# Patient Record
Sex: MALE | Race: WHITE | Employment: FULL TIME | URBAN - METROPOLITAN AREA
[De-identification: names, ages, dates, MRNs, and addresses within clinical notes are randomized per-mention and may not be internally consistent; named-entity substitution may affect disease eponyms.]

---

## 2017-03-08 ENCOUNTER — HOSPITAL ENCOUNTER (EMERGENCY)
Facility: HOSPITAL | Age: 22
Discharge: HOME | End: 2017-03-08
Attending: EMERGENCY MEDICINE

## 2017-03-08 ENCOUNTER — APPOINTMENT (OUTPATIENT)
Dept: CT IMAGING | Facility: HOSPITAL | Age: 22
End: 2017-03-08
Attending: EMERGENCY MEDICINE

## 2017-03-08 VITALS
HEIGHT: 73 IN | SYSTOLIC BLOOD PRESSURE: 105 MMHG | RESPIRATION RATE: 16 BRPM | TEMPERATURE: 98 F | HEART RATE: 77 BPM | WEIGHT: 125 LBS | OXYGEN SATURATION: 99 % | DIASTOLIC BLOOD PRESSURE: 68 MMHG | BODY MASS INDEX: 16.57 KG/M2

## 2017-03-08 DIAGNOSIS — I88.0 MESENTERIC ADENITIS: Primary | ICD-10-CM

## 2017-03-08 LAB
ALBUMIN SERPL-MCNC: 4.1 G/DL (ref 3.5–4.8)
ALP LIVER SERPL-CCNC: 33 U/L (ref 45–117)
ALT SERPL-CCNC: 10 U/L (ref 17–63)
AST SERPL-CCNC: 14 U/L (ref 15–41)
BASOPHILS # BLD AUTO: 0.01 X10(3) UL (ref 0–0.1)
BASOPHILS NFR BLD AUTO: 0.3 %
BILIRUB SERPL-MCNC: 0.8 MG/DL (ref 0.1–2)
BUN BLD-MCNC: 11 MG/DL (ref 8–20)
CALCIUM BLD-MCNC: 9 MG/DL (ref 8.3–10.3)
CHLORIDE: 105 MMOL/L (ref 101–111)
CLARITY UR REFRACT.AUTO: CLEAR
CO2: 30 MMOL/L (ref 22–32)
CREAT BLD-MCNC: 0.96 MG/DL (ref 0.7–1.3)
EOSINOPHIL # BLD AUTO: 0.1 X10(3) UL (ref 0–0.3)
EOSINOPHIL NFR BLD AUTO: 2.6 %
ERYTHROCYTE [DISTWIDTH] IN BLOOD BY AUTOMATED COUNT: 12.2 % (ref 11.5–16)
GLUCOSE BLD-MCNC: 83 MG/DL (ref 70–99)
GLUCOSE UR STRIP.AUTO-MCNC: NEGATIVE MG/DL
HCT VFR BLD AUTO: 45.3 % (ref 37–53)
HGB BLD-MCNC: 16.4 G/DL (ref 13–17)
IMMATURE GRANULOCYTE COUNT: 0.01 X10(3) UL (ref 0–1)
IMMATURE GRANULOCYTE RATIO %: 0.3 %
KETONES UR STRIP.AUTO-MCNC: 80 MG/DL
LARGE PLATELETS: PRESENT
LEUKOCYTE ESTERASE UR QL STRIP.AUTO: NEGATIVE
LYMPHOCYTES # BLD AUTO: 1.17 X10(3) UL (ref 0.9–4)
LYMPHOCYTES NFR BLD AUTO: 30 %
M PROTEIN MFR SERPL ELPH: 7.5 G/DL (ref 6.1–8.3)
MCH RBC QN AUTO: 32.3 PG (ref 27–33.2)
MCHC RBC AUTO-ENTMCNC: 36.2 G/DL (ref 31–37)
MCV RBC AUTO: 89.2 FL (ref 80–99)
MONOCYTES # BLD AUTO: 0.45 X10(3) UL (ref 0.1–0.6)
MONOCYTES NFR BLD AUTO: 11.5 %
MORPHOLOGY: NORMAL
NEUTROPHIL ABS PRELIM: 2.16 X10 (3) UL (ref 1.3–6.7)
NEUTROPHILS # BLD AUTO: 2.16 X10(3) UL (ref 1.3–6.7)
NEUTROPHILS NFR BLD AUTO: 55.3 %
NITRITE UR QL STRIP.AUTO: NEGATIVE
PH UR STRIP.AUTO: 5 [PH] (ref 4.5–8)
PLATELET # BLD AUTO: 148 10(3)UL (ref 150–450)
POTASSIUM SERPL-SCNC: 3.4 MMOL/L (ref 3.6–5.1)
PROT UR STRIP.AUTO-MCNC: NEGATIVE MG/DL
RBC # BLD AUTO: 5.08 X10(6)UL (ref 4.3–5.7)
RBC UR QL AUTO: NEGATIVE
RED CELL DISTRIBUTION WIDTH-SD: 40.4 FL (ref 35.1–46.3)
SODIUM SERPL-SCNC: 141 MMOL/L (ref 136–144)
SP GR UR STRIP.AUTO: 1.03 (ref 1–1.03)
UROBILINOGEN UR STRIP.AUTO-MCNC: 4 MG/DL
WBC # BLD AUTO: 3.9 X10(3) UL (ref 4–13)

## 2017-03-08 PROCEDURE — 85025 COMPLETE CBC W/AUTO DIFF WBC: CPT | Performed by: EMERGENCY MEDICINE

## 2017-03-08 PROCEDURE — 96374 THER/PROPH/DIAG INJ IV PUSH: CPT

## 2017-03-08 PROCEDURE — 80053 COMPREHEN METABOLIC PANEL: CPT | Performed by: EMERGENCY MEDICINE

## 2017-03-08 PROCEDURE — 81003 URINALYSIS AUTO W/O SCOPE: CPT | Performed by: EMERGENCY MEDICINE

## 2017-03-08 PROCEDURE — 74176 CT ABD & PELVIS W/O CONTRAST: CPT

## 2017-03-08 PROCEDURE — 99284 EMERGENCY DEPT VISIT MOD MDM: CPT

## 2017-03-08 RX ORDER — ONDANSETRON 2 MG/ML
4 INJECTION INTRAMUSCULAR; INTRAVENOUS ONCE
Status: COMPLETED | OUTPATIENT
Start: 2017-03-08 | End: 2017-03-08

## 2017-03-08 NOTE — ED INITIAL ASSESSMENT (HPI)
Right lower abdominal pain and vomiting. Patient said pain is worse today. Complaint of chills. Unknown if fevers.

## 2017-03-08 NOTE — ED PROVIDER NOTES
Patient Seen in: [facility] Emergency Department    History   Patient presents with:  Abdomen/Flank Pain (GI/)    Stated Complaint: abd pain    HPI    Patient is a pleasant 27-year-old male, presenting for evaluation of right lower abdominal pain. stated in HPI.     Physical Exam       ED Triage Vitals   BP 03/08/17 1433 118/75 mmHg   Pulse 03/08/17 1433 86   Resp 03/08/17 1433 16   Temp 03/08/17 1433 98.1 °F (36.7 °C)   Temp src 03/08/17 1433 Temporal   SpO2 03/08/17 1433 99 %   O2 Device 03/08/17 1 below (*)     Large Platelets Present (*)     All other components within normal limits   CBC W/ DIFFERENTIAL - Abnormal; Notable for the following:     WBC 3.9 (*)     .0 (*)     All other components within normal limits   CBC WITH DIFFERENTIAL WIT cm. In dilated small bowel loops. BONES:  No lytic or destructive process.  PELVIC ORGANS:  There is some free fluid in the right side of the pelvis surrounding the appendix which is subsequently displaced post rectal contrast. The appendix is normal in ca

## 2017-09-15 ENCOUNTER — HOSPITAL ENCOUNTER (OUTPATIENT)
Age: 22
Discharge: HOME | End: 2017-09-15
Attending: FAMILY MEDICINE
Payer: COMMERCIAL

## 2017-09-15 VITALS
RESPIRATION RATE: 16 BRPM | HEART RATE: 75 BPM | DIASTOLIC BLOOD PRESSURE: 77 MMHG | SYSTOLIC BLOOD PRESSURE: 123 MMHG | TEMPERATURE: 99 F | OXYGEN SATURATION: 99 %

## 2017-09-15 DIAGNOSIS — J02.9 TONSILLOPHARYNGITIS: Primary | ICD-10-CM

## 2017-09-15 DIAGNOSIS — K12.2 UVULITIS: ICD-10-CM

## 2017-09-15 LAB
POCT MONO: NEGATIVE
POCT RAPID STREP: NEGATIVE

## 2017-09-15 PROCEDURE — 86308 HETEROPHILE ANTIBODY SCREEN: CPT | Performed by: FAMILY MEDICINE

## 2017-09-15 PROCEDURE — 87430 STREP A AG IA: CPT | Performed by: FAMILY MEDICINE

## 2017-09-15 PROCEDURE — 99214 OFFICE O/P EST MOD 30 MIN: CPT

## 2017-09-15 PROCEDURE — 87081 CULTURE SCREEN ONLY: CPT | Performed by: FAMILY MEDICINE

## 2017-09-15 RX ORDER — AMOXICILLIN AND CLAVULANATE POTASSIUM 875; 125 MG/1; MG/1
1 TABLET, FILM COATED ORAL 2 TIMES DAILY
Qty: 20 TABLET | Refills: 0 | Status: SHIPPED | OUTPATIENT
Start: 2017-09-15 | End: 2017-09-25

## 2017-09-16 NOTE — ED PROVIDER NOTES
Patient Seen in: [facility]    History   Patient presents with:  Sinus Problem  Sore Throat    Stated Complaint: sore throat and possible sinus infection    HPI  25 yo M here with complaints of sore throat and sinus infection, n elements reviewed from today and agreed except as otherwise stated in HPI.     Physical Exam   ED Triage Vitals [09/15/17 1906]  BP: 123/77  Pulse: 75  Resp: 16  Temp: 98.5 °F (36.9 °C)  Temp src: Oral  SpO2: 99 %  O2 Device: None (Room air)    Current:BP 1 negative   Rapid test - negative   Throat Cx pending   Rx Augmentin 875 mg twice daily X 10 days   Take daily probiotics (Culturelle / Florastor) while on the antibiotic to avoid any GI distress from the antibiotic       Disposition and Plan     Clinical I

## 2017-09-16 NOTE — ED INITIAL ASSESSMENT (HPI)
Patient presents with sore throat and sinus congestion x 5 days. Clear nasal drainage. Sweats. Dry cough

## 2018-04-13 PROCEDURE — 82746 ASSAY OF FOLIC ACID SERUM: CPT | Performed by: FAMILY MEDICINE

## 2018-04-13 PROCEDURE — 82607 VITAMIN B-12: CPT | Performed by: FAMILY MEDICINE

## 2018-04-13 PROCEDURE — 86803 HEPATITIS C AB TEST: CPT | Performed by: FAMILY MEDICINE

## 2018-04-13 PROCEDURE — 82747 ASSAY OF FOLIC ACID RBC: CPT | Performed by: FAMILY MEDICINE

## 2018-10-22 ENCOUNTER — LAB SERVICES (OUTPATIENT)
Dept: OTHER | Age: 23
End: 2018-10-22

## 2018-10-22 ENCOUNTER — CHARTING TRANS (OUTPATIENT)
Dept: OTHER | Age: 23
End: 2018-10-22

## 2018-10-22 LAB — RAPID STREP GROUP A: NORMAL

## 2018-11-27 VITALS
DIASTOLIC BLOOD PRESSURE: 66 MMHG | RESPIRATION RATE: 16 BRPM | SYSTOLIC BLOOD PRESSURE: 122 MMHG | HEIGHT: 71 IN | HEART RATE: 82 BPM | TEMPERATURE: 98.4 F

## 2018-12-24 ENCOUNTER — TELEPHONE (OUTPATIENT)
Dept: SCHEDULING | Age: 23
End: 2018-12-24

## 2019-02-23 ENCOUNTER — HOSPITAL ENCOUNTER (OUTPATIENT)
Age: 24
Discharge: HOME | End: 2019-02-23
Attending: EMERGENCY MEDICINE
Payer: COMMERCIAL

## 2019-02-23 ENCOUNTER — APPOINTMENT (OUTPATIENT)
Dept: GENERAL RADIOLOGY | Age: 24
End: 2019-02-23
Attending: EMERGENCY MEDICINE
Payer: COMMERCIAL

## 2019-02-23 VITALS
DIASTOLIC BLOOD PRESSURE: 66 MMHG | RESPIRATION RATE: 20 BRPM | HEART RATE: 79 BPM | SYSTOLIC BLOOD PRESSURE: 121 MMHG | OXYGEN SATURATION: 99 % | TEMPERATURE: 98 F

## 2019-02-23 DIAGNOSIS — T07.XXXA MULTIPLE CONTUSIONS: Primary | ICD-10-CM

## 2019-02-23 PROCEDURE — 73060 X-RAY EXAM OF HUMERUS: CPT | Performed by: EMERGENCY MEDICINE

## 2019-02-23 PROCEDURE — 99213 OFFICE O/P EST LOW 20 MIN: CPT

## 2019-02-23 NOTE — ED PROVIDER NOTES
Patient Seen in: [facility]    History   Patient presents with:  Fall (musculoskeletal, neurologic)    Stated Complaint: R/leg and hip pain, R/arm pain due to fall    HPI    19-year-old male who presents to the immediate care co Resp 20   SpO2 99%         Physical Exam  General: This a pleasant nontoxic appearing patient in no apparent distress alert and oriented ×3  HEENT: Pupils are equal reactive to light. Extra ocular motions are intact.   No scleral icterus or conjunctival pa Discharge condition.           Disposition and Plan     Clinical Impression:  Multiple contusions  (primary encounter diagnosis)    Disposition:  Discharge  2/23/2019  1:26 pm    Follow-up:  Ana Ruffin MD  [address] [facility] [address] [facility]

## 2019-02-23 NOTE — ED INITIAL ASSESSMENT (HPI)
States slipped on ice on Thursday, landed on right side. C/o pain today right arm (shoulder to wrist), right hip, knee and ankle.   Strong steady gait  No loss of consciousness  Did not hit head  Full ROM right arm, shoulder  States arm was shaking yesterd

## 2019-03-05 ENCOUNTER — APPOINTMENT (OUTPATIENT)
Dept: GENERAL RADIOLOGY | Age: 24
End: 2019-03-05
Attending: FAMILY MEDICINE
Payer: COMMERCIAL

## 2019-03-05 ENCOUNTER — HOSPITAL ENCOUNTER (OUTPATIENT)
Age: 24
Discharge: HOME | End: 2019-03-05
Attending: FAMILY MEDICINE
Payer: COMMERCIAL

## 2019-03-05 VITALS
RESPIRATION RATE: 18 BRPM | TEMPERATURE: 99 F | HEART RATE: 90 BPM | DIASTOLIC BLOOD PRESSURE: 67 MMHG | OXYGEN SATURATION: 99 % | SYSTOLIC BLOOD PRESSURE: 115 MMHG
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
## 2019-03-05 DIAGNOSIS — M79.661 PAIN OF RIGHT LOWER LEG: ICD-10-CM

## 2019-03-05 DIAGNOSIS — M79.671 RIGHT FOOT PAIN: ICD-10-CM

## 2019-03-05 DIAGNOSIS — S93.401A SPRAIN OF RIGHT ANKLE, UNSPECIFIED LIGAMENT, INITIAL ENCOUNTER: Primary | ICD-10-CM

## 2019-03-05 PROCEDURE — 99214 OFFICE O/P EST MOD 30 MIN: CPT

## 2019-03-05 PROCEDURE — 73610 X-RAY EXAM OF ANKLE: CPT | Performed by: FAMILY MEDICINE

## 2019-03-05 PROCEDURE — 73630 X-RAY EXAM OF FOOT: CPT | Performed by: FAMILY MEDICINE

## 2019-03-05 PROCEDURE — 73590 X-RAY EXAM OF LOWER LEG: CPT | Performed by: FAMILY MEDICINE

## 2019-03-05 RX ORDER — IBUPROFEN 400 MG/1
400 TABLET ORAL EVERY 8 HOURS PRN
COMMUNITY

## 2019-03-05 NOTE — ED INITIAL ASSESSMENT (HPI)
C/o right leg pain from knee down to ankle. Last night swelling noted to right lower leg. Had slipped and fall on ice on 2/21/2019. C/o right leg pain not any better since the last visit here on 2/23/2019.

## 2019-03-05 NOTE — ED PROVIDER NOTES
Patient [facility]en in: [facility]    History   Patient presents with:  Leg Pain    Stated Complaint: fall on 2-23-19 on rt side now rt leg pain    HPI    This 12-year-old male presents to the office with complaint of worsening pain to Systems    Positive for stated complaint: fall on 2-23-19 on rt side now rt leg pain  Other systems are as noted in HPI. Constitutional and vital signs reviewed. All other systems reviewed and negative except as noted above.     Physical Exam     ED T has right leg pain. Patient states he got it checked out two weeks ago as well and states it has not improved. Patient has general knee to ankle pain, but states it is localized on the front anterior ankle region.   Patient also states there was swelling right leg pain. Patient states he got it checked out two weeks ago as well and states it has not improved. Patient has general knee to ankle pain, but states it is localized on the front anterior ankle region.   Patient also states there was swelling last visit in 1 week  if not improving        Medications Prescribed:  Current Discharge Medication List      Take ibuprofen 600 mg every 6-8 hours as needed for pain. Ice and elevate the right ankle.   Always ice through a towel or clothing, never directly on

## 2019-10-24 ENCOUNTER — WALK IN (OUTPATIENT)
Dept: URGENT CARE | Age: 24
End: 2019-10-24

## 2019-10-24 VITALS
BODY MASS INDEX: 17.5 KG/M2 | WEIGHT: 125 LBS | RESPIRATION RATE: 16 BRPM | DIASTOLIC BLOOD PRESSURE: 66 MMHG | HEART RATE: 84 BPM | OXYGEN SATURATION: 98 % | HEIGHT: 71 IN | SYSTOLIC BLOOD PRESSURE: 98 MMHG | TEMPERATURE: 98 F

## 2019-10-24 DIAGNOSIS — J02.9 SORE THROAT: ICD-10-CM

## 2019-10-24 DIAGNOSIS — R05.8 ALLERGIC COUGH: Primary | ICD-10-CM

## 2019-10-24 LAB
INTERNAL PROCEDURAL CONTROLS ACCEPTABLE: YES
S PYO AG THROAT QL IA.RAPID: NEGATIVE

## 2019-10-24 PROCEDURE — 87880 STREP A ASSAY W/OPTIC: CPT | Performed by: NURSE PRACTITIONER

## 2019-10-24 PROCEDURE — 99203 OFFICE O/P NEW LOW 30 MIN: CPT | Performed by: NURSE PRACTITIONER

## 2019-10-24 RX ORDER — PREDNISONE 20 MG/1
20 TABLET ORAL DAILY
Qty: 3 TABLET | Refills: 0 | Status: SHIPPED | OUTPATIENT
Start: 2019-10-24

## 2019-10-24 ASSESSMENT — ENCOUNTER SYMPTOMS
FEVER: 0
DIARRHEA: 0
DIZZINESS: 0
EYE REDNESS: 0
PHOTOPHOBIA: 0
VOMITING: 0
RHINORRHEA: 0
SORE THROAT: 1
COUGH: 1
CHILLS: 0
TROUBLE SWALLOWING: 0
EYE ITCHING: 0
NUMBNESS: 0
SEIZURES: 0
WEAKNESS: 0
DIAPHORESIS: 0
SPEECH DIFFICULTY: 0
VOICE CHANGE: 1
HEADACHES: 1
CHEST TIGHTNESS: 0
ACTIVITY CHANGE: 0
NAUSEA: 0
SHORTNESS OF BREATH: 0
ABDOMINAL PAIN: 0
WHEEZING: 0
EYE DISCHARGE: 0
SINUS PAIN: 0

## 2022-11-06 ENCOUNTER — OFFICE VISIT (OUTPATIENT)
Dept: FAMILY MEDICINE CLINIC | Facility: CLINIC | Age: 27
End: 2022-11-06
Payer: COMMERCIAL

## 2022-11-06 VITALS
OXYGEN SATURATION: 98 % | BODY MASS INDEX: 16.83 KG/M2 | RESPIRATION RATE: 18 BRPM | TEMPERATURE: 98 F | WEIGHT: 127 LBS | SYSTOLIC BLOOD PRESSURE: 98 MMHG | HEART RATE: 86 BPM | HEIGHT: 73 IN | DIASTOLIC BLOOD PRESSURE: 56 MMHG

## 2022-11-06 DIAGNOSIS — J02.9 SORE THROAT: ICD-10-CM

## 2022-11-06 DIAGNOSIS — J06.9 VIRAL URI: Primary | ICD-10-CM

## 2022-11-06 LAB
CONTROL LINE PRESENT WITH A CLEAR BACKGROUND (YES/NO): YES YES/NO
KIT LOT #: NORMAL NUMERIC
STREP GRP A CUL-SCR: NEGATIVE

## 2022-11-06 PROCEDURE — 3078F DIAST BP <80 MM HG: CPT | Performed by: NURSE PRACTITIONER

## 2022-11-06 PROCEDURE — 3074F SYST BP LT 130 MM HG: CPT | Performed by: NURSE PRACTITIONER

## 2022-11-06 PROCEDURE — 87880 STREP A ASSAY W/OPTIC: CPT | Performed by: NURSE PRACTITIONER

## 2022-11-06 PROCEDURE — 3008F BODY MASS INDEX DOCD: CPT | Performed by: NURSE PRACTITIONER

## 2022-11-06 PROCEDURE — 99202 OFFICE O/P NEW SF 15 MIN: CPT | Performed by: NURSE PRACTITIONER

## 2023-01-15 ENCOUNTER — HOSPITAL ENCOUNTER (EMERGENCY)
Age: 28
Discharge: HOME | End: 2023-01-15
Attending: EMERGENCY MEDICINE
Payer: COMMERCIAL

## 2023-01-15 VITALS
OXYGEN SATURATION: 97 % | WEIGHT: 127 LBS | SYSTOLIC BLOOD PRESSURE: 111 MMHG | BODY MASS INDEX: 17 KG/M2 | RESPIRATION RATE: 18 BRPM | DIASTOLIC BLOOD PRESSURE: 74 MMHG | TEMPERATURE: 98 F | HEART RATE: 87 BPM

## 2023-01-15 DIAGNOSIS — U07.1 COVID-19 VIRUS INFECTION: Primary | ICD-10-CM

## 2023-01-15 LAB — SARS-COV-2 RNA RESP QL NAA+PROBE: DETECTED

## 2023-01-15 PROCEDURE — 99283 EMERGENCY DEPT VISIT LOW MDM: CPT

## 2023-01-15 RX ORDER — NAPROXEN 500 MG/1
500 TABLET ORAL 2 TIMES DAILY PRN
Qty: 20 TABLET | Refills: 0 | Status: SHIPPED | OUTPATIENT
Start: 2023-01-15 | End: 2023-01-25

## 2023-01-15 NOTE — ED QUICK NOTES
Pt c/o frontal lobe headache, denies n/v/d, fevers, sore throat or abdominal discomfort.  PT denies dizziness or visual disturbances

## 2024-03-01 ENCOUNTER — APPOINTMENT (OUTPATIENT)
Dept: GENERAL RADIOLOGY | Age: 29
End: 2024-03-01
Attending: PHYSICIAN ASSISTANT
Payer: COMMERCIAL

## 2024-03-01 ENCOUNTER — HOSPITAL ENCOUNTER (EMERGENCY)
Age: 29
Discharge: HOME | End: 2024-03-01
Payer: COMMERCIAL

## 2024-03-01 VITALS
RESPIRATION RATE: 17 BRPM | DIASTOLIC BLOOD PRESSURE: 71 MMHG | TEMPERATURE: 98 F | HEART RATE: 90 BPM | BODY MASS INDEX: 16.57 KG/M2 | HEIGHT: 73 IN | OXYGEN SATURATION: 97 % | SYSTOLIC BLOOD PRESSURE: 112 MMHG | WEIGHT: 125 LBS

## 2024-03-01 DIAGNOSIS — M25.562 ACUTE PAIN OF LEFT KNEE: Primary | ICD-10-CM

## 2024-03-01 PROCEDURE — 73560 X-RAY EXAM OF KNEE 1 OR 2: CPT | Performed by: PHYSICIAN ASSISTANT

## 2024-03-01 PROCEDURE — 99283 EMERGENCY DEPT VISIT LOW MDM: CPT

## 2024-03-02 NOTE — ED PROVIDER NOTES
Patient Seen in: [facility] [place]      History     Chief Complaint   Patient presents with    Leg or Foot Injury     Stated Complaint: left knee pain x1 day, denies fall/trauma    Subjective:   HPI    CHIEF COMPLAINT: Left knee pain     HISTORY OF PRESENT ILLNESS: Patient is a 28-year-old male who presents for evaluation of left knee pain for the last 1 day.  No known injury.  States he feels like the knee is locking at times.  He has been ambulatory.  Denies any redness, warmth, swelling of the knee.  He tried ibuprofen and did get some good relief temporarily, but once the medicine wore off his symptoms return. Denies joint instability.     REVIEW OF SYSTEMS:  Constitutional: no fever, no chills  Eyes: no discharge  ENT: no sore throat  Cardiovascular: no chest pain, no palpitations  Respiratory: no cough, no shortness of breath  Gastrointestinal: no abdominal pain, no vomiting  Genitourinary: no hematuria  Musculoskeletal: no back pain  Skin: no rashes  Neurological: no headache     Otherwise a complete review of systems was obtained and other than the HPI was negative     The patient's medication list, past medical history and social history elements is as listed in today's nurse's notes are reviewed and agree. The patient's family history is reviewed and is noncontributory to the presenting problem, except as indicated as above.    Objective:   Past Medical History:   Diagnosis Date    ALLERGIC RHINITIS 2000    Allergic rhinitis     AR (allergic rhinitis) 9/15/2000    History of RSV infection 9/15/2001    HOSPITALIZATIONS 1996    RSV    HOSPITALIZATIONS 2001    UROLITHIASIS    KIDNEY STONE 2001    OTHER DISEASES 2001    SPEECH DELAY    OTHER DISEASES 2001    VARICELLA  ILLNESS    Routine child health maintenance 9/15/2012    Urolithiasis 9/15/2001              History reviewed. No pertinent surgical history.             Social History     Socioeconomic History    Marital status:  Single    Number of children: 0    Years of education: HIGH [employer] [facility] [employer]   Occupational History    Occupation: SUPERVISOR     Comment: [employer]   Tobacco Use    Smoking status: Never    Smokeless tobacco: Never   Substance and Sexual Activity    Alcohol use: Yes     Alcohol/week: 0.0 standard drinks of alcohol     Comment: RARE SINGLE DRINKS. DENIES BINGE EVER.    Drug use: No     Comment: NEVER.    Sexual activity: Never   Other Topics Concern     Service No    Blood Transfusions No    Caffeine Concern No     Comment: TEA DAILY, RARE POP.    Weight Concern No    Special Diet No    Exercise Yes     Comment: WAKLS ALOT AT WORK    Seat Belt Yes   Social History Narrative    LIVES WITH MOTHER IN SINGLE FAMMILY HOUSE.              Review of Systems    Positive for stated complaint: left knee pain x1 day, denies fall/trauma  Other systems are as noted in HPI.  Constitutional and vital signs reviewed.      All other systems reviewed and negative except as noted above.    Physical Exam     ED Triage Vitals [03/01/24 1941]   /71   Pulse 90   Resp 17   Temp 98.4 °F (36.9 °C)   Temp src Temporal   SpO2 97 %   O2 Device None (Room air)       Current:/71   Pulse 90   Temp 98.4 °F (36.9 °C) (Temporal)   Resp 17   Ht 185.4 cm (6' 1\")   Wt 56.7 kg   SpO2 97%   BMI 16.49 kg/m²         Physical Exam    Vital signs and nursing notes reviewed  General Appearance: No acute distress  Neurological:  A&Ox3,  Gait normal.  Psychiatric: calm and cooperative  Respiratory: CTAB  Cardiovascular: RRR, S1/S2, no m/c/r  Musculoskeletal: Extremities are symmetrical, full range of motion  Left knee exam: No reproducible tenderness to palpation.  Full active and passive ROM.  Negative anterior/posterior drawer;  varus/valgus stress negative. No warmth or erythema of the joint. No edema noted. No joint laxity noted.  Bilateral LE: +5/5 strength . 2+ patellar reflexes. Normal sensation.  2+ PTs bilaterally  Skin:  warm and dry, no  olayinka.         ED Course   Labs Reviewed - No data to display          XR KNEE (1 OR 2 VIEWS), LEFT (CPT=73560)    Result Date: 3/1/2024  PROCEDURE:  XR KNEE (1 OR 2 VIEWS), LEFT (CPT=73560)  COMPARISON:  None.  INDICATIONS:  left knee pain x1 day, denies fall/trauma  PATIENT STATED HISTORY: (As transcribed by Technologist)  Left knee pain all over joint, worse posterior. Sudden onset, started yesterday afternoon.    FINDINGS:  No fracture, dislocation or osseous abnormality.            CONCLUSION:  No abnormality demonstrated in the left knee.     LOCATION:  [facility]   Dictated by (CST): Ej Avalos MD on 3/01/2024 at 9:05 PM     Finalized by (CST): Ej Avalos MD on 3/01/2024 at 9:05 PM        I personally reviewed the x-ray images.  No evidence of an intra-articular foreign body.  No fracture or dislocation appreciated         MDM      This is a well-appearing 28-year-old male with stable vital signs who presents for evaluation of 1 day of left knee pain.  Described that he felt a locking sensation.  Good range of motion here.  No warmth or erythema appreciated on physical exam.  X-rays of the knee show no acute abnormality.  Discussed the findings with the patient.  Will give him an Ace wrap to wear as needed for comfort.  Ice and elevate the affected area.  Tylenol or ibuprofen as needed for pain.  Activity as tolerated.  Follow-up with Ortho.  If there are any new, changing or worsening symptoms return for reevaluation or go to the ER.  Patient voiced understanding to the treatment plan.  All questions answered.                                 Medical Decision Making  Amount and/or Complexity of Data Reviewed  Radiology: ordered and independent interpretation performed. Decision-making details documented in ED Course.    Risk  OTC drugs.        Disposition and Plan     Clinical Impression:  1. Acute pain of left knee         Disposition:  Discharge  3/1/2024  9:17 pm    Follow-up:  Amish Washington,  GIBRAN  [address] [place] [address]  [phone]    Follow up  ortho follow up          Medications Prescribed:  Discharge Medication List as of 3/1/2024  9:24 PM

## 2024-03-02 NOTE — ED INITIAL ASSESSMENT (HPI)
Left knee started locking starting yesterday, when up and walking around and after sitting for awhile. Took ibuprofen approx 1330 today. Denies injury.

## 2024-03-02 NOTE — DISCHARGE INSTRUCTIONS
Ice and elevate the affected area.  Take ibuprofen 400 mg combined with acetaminophen 500 mg every 4-6 hours as needed for pain.  Activity as tolerated.    Follow-up with Ortho.  See your discharge paperwork for referral information.  If there are any new, changing or worsening symptoms return for reevaluation or go to the ER.

## (undated) NOTE — ED AVS SNAPSHOT
[facility] Emergency Department    [facility]  [address] [address]    Phone:  [phone]    Fax:  [address]   MRN: [id number]    Department:  [facility] Emergency Department   Date of Visit:  3/8/2 nuestro adminstrador de rosie louise [phone]    Expect to receive an electronic request (by e-mail or text) to complete a self-assessment the day after your visit. You may also receive a call from our patient liason soon after your visit.  Also, some p [facility] [address] ([address]) [address]  ([address]) [address] [facility]. ([address]) [address] [facility] [address] (8 PROCEDURE:  CT APPENDIX ABD/PEL WO CONTRAST (CPT=74176)     COMPARISON:  None.      INDICATIONS:  abdominal pain     TECHNIQUE:  Noncontrast multislice scanning was performed through the abdomen and pelvis, to include thin section imaging through the appen your Zip Code and Date of Birth to complete the sign-up process. If you do not sign up before the expiration date, you must request a new code.     Your unique [employer] Access Code: [email or web address]  Expires: 3/31/2017  2:58 PM    If you have questions, you can c

## (undated) NOTE — ED AVS SNAPSHOT
[facility] Emergency Department    [place] [address]    Phone:  [phone]    Fax:  [address]   MRN: [id number]    Department:  [facility] Emergency Department   Date of Visit:  3/8/2 IF THERE IS ANY CHANGE OR WORSENING OF YOUR CONDITION, CALL YOUR PRIMARY CARE PHYSICIAN AT ONCE OR RETURN IMMEDIATELY TO THE EMERGENCY DEPARTMENT.     If you have been prescribed any medication(s), please fill your prescription right away and begin taking t

## (undated) NOTE — LETTER
Date & Time: 1/15/2023, 3:16 PM  Patient: Alla Whitfield  Encounter Provider(s):    Mima Warren MD       To Whom It May Concern:    Malick Goldsmith was seen and treated in our department on 1/15/2023. He should not return to work until 1/21/2023.     If you have any questions or concerns, please do not hesitate to call.        _____________________________  Physician/APC Signature